# Patient Record
Sex: FEMALE | Race: WHITE | Employment: FULL TIME | URBAN - NONMETROPOLITAN AREA
[De-identification: names, ages, dates, MRNs, and addresses within clinical notes are randomized per-mention and may not be internally consistent; named-entity substitution may affect disease eponyms.]

---

## 2022-05-13 LAB — HCV AB SER-ACNC: NEGATIVE

## 2022-12-04 LAB — EXTERNAL HIV SCREEN: NORMAL

## 2024-03-05 ENCOUNTER — OFFICE VISIT (OUTPATIENT)
Dept: FAMILY MEDICINE CLINIC | Facility: CLINIC | Age: 24
End: 2024-03-05
Payer: COMMERCIAL

## 2024-03-05 VITALS
RESPIRATION RATE: 18 BRPM | BODY MASS INDEX: 18.37 KG/M2 | DIASTOLIC BLOOD PRESSURE: 68 MMHG | HEIGHT: 64 IN | WEIGHT: 107.6 LBS | SYSTOLIC BLOOD PRESSURE: 112 MMHG | TEMPERATURE: 98.9 F | OXYGEN SATURATION: 98 % | HEART RATE: 93 BPM

## 2024-03-05 DIAGNOSIS — Z76.89 ENCOUNTER TO ESTABLISH CARE: Primary | ICD-10-CM

## 2024-03-05 DIAGNOSIS — M53.3 COCCYGEAL PAIN: ICD-10-CM

## 2024-03-05 DIAGNOSIS — Z59.89 DOES NOT HAVE HEALTH INSURANCE: ICD-10-CM

## 2024-03-05 DIAGNOSIS — T14.8XXA MUSCULOSKELETAL STRAIN: ICD-10-CM

## 2024-03-05 PROCEDURE — T1015 CLINIC SERVICE: HCPCS | Performed by: FAMILY MEDICINE

## 2024-03-05 RX ORDER — FOLIC ACID, .BETA.-CAROTENE, ASCORBIC ACID, CHOLECALCIFEROL, .ALPHA.-TOCOPHEROL ACETATE, DL-, THIAMINE MONONITRATE, RIBOFLAVIN, NIACINAMIDE, PYRIDOXINE HYDROCHLORIDE, CYANOCOBALAMIN, CALCIUM PANTOTHENATE, CALCIUM CARBONATE, FERROUS FUMARATE, AND ZINC OXIDE 1; 1000; 100; 400; 30; 3; 3; 15; 20; 12; 7; 200; 29; 20 MG/1; [IU]/1; MG/1; [IU]/1; [IU]/1; MG/1; MG/1; MG/1; MG/1; UG/1; MG/1; MG/1; MG/1; MG/1
TABLET, CHEWABLE ORAL
COMMUNITY

## 2024-03-05 SDOH — ECONOMIC STABILITY - INCOME SECURITY: OTHER PROBLEMS RELATED TO HOUSING AND ECONOMIC CIRCUMSTANCES: Z59.89

## 2024-03-05 NOTE — PROGRESS NOTES
Assessment/Plan:  Patient's symptoms are most likely 2/2 musculoskeletal pain and less likely evidence of an occult fracture without a traumatic mechanism and without reason to suspect osteoporosis at her age. Too far removed from pregnancy which was in 2022 to be related to that logically. Does not appear to be sciatica. Recommended OTC acetaminophen and ibuprofen prn for symptoms. Will re-evaluate for annual physical.  No depression or anxiety currently.     Diagnoses and all orders for this visit:    Encounter to establish care    Musculoskeletal strain    Coccygeal pain    Does not have health insurance  -     Ambulatory Referral to Social Work Care Management Program; Future    Other orders  -     Prenatal Vit-Fe Fumarate-FA (Prenatal 19) 29-1 MG CHEW; Chew  -     Ferrous Sulfate (IRON PO); Take 1 tablet by mouth daily (Patient not taking: Reported on 3/5/2024)          Subjective:      Patient ID: Kodi Braga is a 23 y.o. female.    HPI  CC: establish, tailbone/sciatic pain  X1 month tailbone & left LE pain posterior radiating to the knee but not the foot with walking, sitting, standing, constant. Does not change with bending/positional changes. Has been improving the past week with more efforts to walk and stand. Sits down as an  at work but it is a small office and does not cover insurance. States when she was pregnant had sciatica which improved after delivery. Possibly strained while lifting her daughter or her daughter's diaper bags over the past month.  Hx: Scioliosis, postpartum depression resolved, anxiety resolved postpartum, diastasis recti  Meds: prenatal vitamin  Surgical: Joffre teeth removal  Family: mother fibromyalgia & scoliosis, father healthy, grandmother DM and grandparents HTN  Immunizations: up to date childhood, covid x2 shots, flu shot declined  Social: No financial or transportation difficulties. Lives with  and child, no pets. Sexually active 1 male partner,  menarche 10yo, regular menses last 7d no heavy bleeding. Condom for contraception, discussed contraceptive options for efficacy, discussed OTC contraceptive method overlapping for increased efficacy as she does not want hormonal contraception at this time.    Other: Hopes to become pregnant in maybe 1-2 years. In November intends to look into getting insurance through 's job.    PHQ-2/9 Depression Screening    Little interest or pleasure in doing things: 0 - not at all  Feeling down, depressed, or hopeless: 0 - not at all  PHQ-2 Score: 0  PHQ-2 Interpretation: Negative depression screen       LORENA-7 Flowsheet Screening      Flowsheet Row Most Recent Value   Over the last 2 weeks, how often have you been bothered by any of the following problems?    Feeling nervous, anxious, or on edge 1   Not being able to stop or control worrying 0   Worrying too much about different things 1   Trouble relaxing 0   Being so restless that it is hard to sit still 0   Becoming easily annoyed or irritable 0   Feeling afraid as if something awful might happen 2   LORENA-7 Total Score 4              The following portions of the patient's history were reviewed and updated as appropriate: allergies, current medications, past family history, past medical history, past social history, past surgical history, and problem list.    Review of Systems   Constitutional:  Negative for chills and fever.   Respiratory:  Negative for cough and shortness of breath.    Cardiovascular:  Negative for chest pain and palpitations.   Gastrointestinal:  Negative for abdominal pain, constipation, diarrhea, nausea and vomiting.        No bowel incontinence   Genitourinary:  Negative for decreased urine volume and difficulty urinating.        No bladder incontinence   Musculoskeletal:  Positive for back pain. Negative for arthralgias and gait problem.   Skin:  Negative for color change and rash.   Neurological:  Negative for seizures and syncope.  "  Psychiatric/Behavioral:  Negative for agitation, confusion and suicidal ideas. The patient is not nervous/anxious.    All other systems reviewed and are negative.        Objective:      /68 (BP Location: Left arm, Patient Position: Sitting, Cuff Size: Standard)   Pulse 93   Temp 98.9 °F (37.2 °C) (Tympanic)   Resp 18   Ht 5' 4\" (1.626 m)   Wt 48.8 kg (107 lb 9.6 oz)   SpO2 98%   Breastfeeding Yes   BMI 18.47 kg/m²          Physical Exam  Vitals reviewed.   Constitutional:       General: She is not in acute distress.     Appearance: Normal appearance. She is not ill-appearing.   HENT:      Head: Normocephalic and atraumatic.      Nose: No rhinorrhea.   Eyes:      General: No scleral icterus.        Right eye: No discharge.         Left eye: No discharge.      Conjunctiva/sclera: Conjunctivae normal.   Cardiovascular:      Rate and Rhythm: Normal rate and regular rhythm.      Pulses: Normal pulses.      Heart sounds: Normal heart sounds. No murmur heard.     No friction rub. No gallop.   Pulmonary:      Effort: Pulmonary effort is normal. No respiratory distress.      Breath sounds: Normal breath sounds. No stridor. No wheezing, rhonchi or rales.   Abdominal:      General: Bowel sounds are normal. There is no distension.      Palpations: Abdomen is soft. There is no mass.      Tenderness: There is no abdominal tenderness. There is no guarding or rebound.   Musculoskeletal:         General: No swelling.   Skin:     General: Skin is warm and dry.      Coloration: Skin is not jaundiced or pale.   Neurological:      Mental Status: She is alert.   Psychiatric:         Mood and Affect: Mood normal.         Behavior: Behavior normal.           "

## 2024-03-07 ENCOUNTER — PATIENT OUTREACH (OUTPATIENT)
Dept: FAMILY MEDICINE CLINIC | Facility: CLINIC | Age: 24
End: 2024-03-07

## 2024-03-07 NOTE — PROGRESS NOTES
Received referral from patients PCP Luis Amaya MD requesting that [facility] outreach patient and assist with obtaining insurance.    CM spoke to patient, introduced role and reason for call. Patient reported that she did have MA in the past but is over the income limit now. Discussed [employer] insurance marketplace and patient reported that she did try that as well and the lowest cost premiums were still very high. Patient reported that she will look into possibly getting on her husbands health insurance this fall.    Patient reported no other needs. SW encouraged patient to reach out if other needs arise, patient understood and thankful for outreach.

## 2024-04-05 ENCOUNTER — TELEPHONE (OUTPATIENT)
Dept: ADMINISTRATIVE | Facility: OTHER | Age: 24
End: 2024-04-05

## 2024-04-05 LAB — EXTERNAL CHLAMYDIA RESULT: NOT DETECTED

## 2024-04-05 NOTE — TELEPHONE ENCOUNTER
----- Message from Alexander Lincoln MA sent at 4/5/2024 12:23 PM EDT -----  Regarding: HEP C /HIV, CHLAMYDIA & PAP  04/05/24 12:23 PM    Hello, our patient Kodi Braga has had Hepatitis C and HIV completed/performed. Please assist in updating the patient chart by pulling the Care Everywhere (CE) document. The date of service is 5/13/22.     Thank you,  Alexander Lincoln MA  [facility]       04/05/24 12:26 PM    Hello, our patient Kodi Braga has had Chlamydia & PAP completed/performed. Please assist in updating the patient chart by pulling the Care Everywhere (CE) document. The date of service is 11/15/22.     Thank you,  Alexander Lincoln MA  [facility]

## 2024-04-05 NOTE — TELEPHONE ENCOUNTER
Upon review of the In Basket request we were able to locate, review, and update the patient chart as requested for Chlamydia, Hepatitis C , and HIV.  Pap not found    Any additional questions or concerns should be emailed to the Practice Liaisons via the appropriate education email address, please do not reply via In Basket.    Thank you  Donita Payan

## 2024-04-10 ENCOUNTER — OFFICE VISIT (OUTPATIENT)
Dept: FAMILY MEDICINE CLINIC | Facility: CLINIC | Age: 24
End: 2024-04-10
Payer: COMMERCIAL

## 2024-04-10 VITALS
OXYGEN SATURATION: 98 % | TEMPERATURE: 99.5 F | SYSTOLIC BLOOD PRESSURE: 115 MMHG | RESPIRATION RATE: 18 BRPM | HEART RATE: 96 BPM | DIASTOLIC BLOOD PRESSURE: 75 MMHG | BODY MASS INDEX: 18.74 KG/M2 | HEIGHT: 64 IN | WEIGHT: 109.8 LBS

## 2024-04-10 DIAGNOSIS — Z13.9 SCREENING DUE: ICD-10-CM

## 2024-04-10 DIAGNOSIS — Z86.39 HISTORY OF IRON DEFICIENCY: ICD-10-CM

## 2024-04-10 DIAGNOSIS — L63.0 ALOPECIA (CAPITIS) TOTALIS: ICD-10-CM

## 2024-04-10 DIAGNOSIS — B35.1 ONYCHOMYCOSIS: ICD-10-CM

## 2024-04-10 DIAGNOSIS — Z00.00 ANNUAL PHYSICAL EXAM: Primary | ICD-10-CM

## 2024-04-10 PROCEDURE — T1015 CLINIC SERVICE: HCPCS | Performed by: FAMILY MEDICINE

## 2024-04-10 NOTE — PROGRESS NOTES
Assessment/Plan     Healthy female exam. Onychomycosis of multiple nails involves too much of the nail and too many nails to benefit from topical therapy. Not recommended to breastfeed while taking systemic antifungals. Discussed that now would not be appropriate time for treatment of nails, which she was agreeable with. Can follow up 1 year for next physical or earlier as needed and initiate antifungal treatment if stopping breastfeeding as situation develops. Discussed that, especially once breastfeeding ceases, condoms alone will be insufficient to prevent pregnancy and tus contraceptive options should be considered in the future, important to ensure no pregnancy while taking antifungals systemically in the future. Will check CBC to rule out anemia, if present can assess for iron deficiency once more.  Given Hx alopecia totalis, worth screening TSH for autoimmune comorbidities periodically    2. Patient Counseling:  --Nutrition: Stressed importance of moderation in sodium/caffeine intake, saturated fat and cholesterol, caloric balance, sufficient intake of fresh fruits, vegetables, fiber, calcium, iron, and 1 mg of folate supplement per day (for females capable of pregnancy).  --Discussed the issue of estrogen replacement, calcium supplement, and the daily use of baby aspirin.  --Exercise: Stressed the importance of regular exercise.   --Substance Abuse: Discussed cessation/primary prevention of tobacco, alcohol, or other drug use; driving or other dangerous activities under the influence; availability of treatment for abuse.    --Sexuality: Discussed sexually transmitted diseases, partner selection, use of condoms, avoidance of unintended pregnancy  and contraceptive alternatives.   --Injury prevention: Discussed safety belts, safety helmets, smoke detector, smoking near bedding or upholstery.   --Dental health: Discussed importance of regular tooth brushing, flossing, and dental visits.  --Immunizations  reviewed.  --Discussed benefits of screening colonoscopy.  --After hours service discussed with patient    3. Discussed the patient's BMI with her.  The BMI is in the acceptable range  4. Follow up in one year.    1. Annual physical exam  TSH, 3rd generation with Free T4 reflex    CBC and differential    Comprehensive metabolic panel      2. Screening due  TSH, 3rd generation with Free T4 reflex    CBC and differential    Comprehensive metabolic panel      3. Alopecia (capitis) totalis  TSH, 3rd generation with Free T4 reflex      4. History of iron deficiency  CBC and differential      5. Onychomycosis              Subjective     Kodi Braga is a 23 y.o. female and is here for a comprehensive physical exam. The patient reports as below.  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  Sees a dermatologist for toenail fungus. No appointments scheduled with derm, is concerned about her toenails given she is breastfeeding and we discussed systemic vs topical antifungals.  Has not been taking iron for a year. Will check CBC to evaluate.  Social work inquired about insurance as requested, no significant changes made as pt already attempted to obtain insurance through [place] marketplace.    Do you take any herbs or supplements that were not prescribed by a doctor? no    Gets periods every ~33days, lasting 7 days, mild-moderate, no cramps.    The following portions of the patient's history were reviewed and updated as appropriate: allergies, current medications, past family history, past medical history, past social history, past surgical history, and problem list.    Review of Systems  Review of Systems   Constitutional:  Negative for chills and fever.   HENT:  Negative for ear pain and sore throat.    Eyes:  Negative for pain and visual disturbance.   Respiratory:  Negative for cough and shortness of  "breath.    Cardiovascular:  Negative for chest pain and palpitations.   Gastrointestinal:  Negative for abdominal pain, constipation, diarrhea, nausea and vomiting.   Genitourinary:  Negative for dysuria and hematuria.   Musculoskeletal:  Negative for arthralgias and back pain.   Skin:  Negative for color change and rash.        Toenail changes   Neurological:  Negative for dizziness and light-headedness.   Psychiatric/Behavioral:  Negative for agitation and confusion.    All other systems reviewed and are negative.        Objective   /75 (BP Location: Left arm, Patient Position: Sitting, Cuff Size: Adult)   Pulse 96   Temp 99.5 °F (37.5 °C)   Resp 18   Ht 5' 4\" (1.626 m)   Wt 49.8 kg (109 lb 12.8 oz)   LMP 03/30/2024   SpO2 98%   BMI 18.85 kg/m²   Physical Exam  Vitals and nursing note reviewed.   Constitutional:       General: She is not in acute distress.     Appearance: Normal appearance. She is well-developed. She is not ill-appearing.   HENT:      Head: Normocephalic and atraumatic.      Nose: No rhinorrhea.   Eyes:      General: No scleral icterus.        Right eye: No discharge.         Left eye: No discharge.      Conjunctiva/sclera: Conjunctivae normal.   Cardiovascular:      Rate and Rhythm: Normal rate and regular rhythm.      Pulses: Normal pulses.      Heart sounds: Normal heart sounds. No murmur heard.     No friction rub. No gallop.   Pulmonary:      Effort: Pulmonary effort is normal. No respiratory distress.      Breath sounds: Normal breath sounds. No stridor. No wheezing, rhonchi or rales.   Abdominal:      General: Abdomen is flat. Bowel sounds are normal. There is no distension.      Palpations: Abdomen is soft. There is no mass.      Tenderness: There is no abdominal tenderness. There is no guarding or rebound.   Musculoskeletal:         General: No swelling.      Cervical back: Neck supple.      Comments: Toes with abnormal sizes and contours, nails are smaller than would be " expected. L great toenail & R 2nd & 3rd toenails hyperkeratotic dark yellow with raised nail plate, no spooning onycholysis or salmon pitting   Skin:     General: Skin is warm and dry.      Capillary Refill: Capillary refill takes less than 2 seconds.      Coloration: Skin is not jaundiced or pale.   Neurological:      Mental Status: She is alert.   Psychiatric:         Mood and Affect: Mood normal.         Behavior: Behavior normal.

## 2024-05-20 ENCOUNTER — OFFICE VISIT (OUTPATIENT)
Dept: URGENT CARE | Facility: CLINIC | Age: 24
End: 2024-05-20
Payer: COMMERCIAL

## 2024-05-20 ENCOUNTER — APPOINTMENT (OUTPATIENT)
Dept: RADIOLOGY | Facility: CLINIC | Age: 24
End: 2024-05-20
Payer: COMMERCIAL

## 2024-05-20 VITALS
RESPIRATION RATE: 18 BRPM | TEMPERATURE: 98.6 F | HEART RATE: 105 BPM | DIASTOLIC BLOOD PRESSURE: 60 MMHG | OXYGEN SATURATION: 98 % | SYSTOLIC BLOOD PRESSURE: 102 MMHG

## 2024-05-20 DIAGNOSIS — S93.401A SPRAIN OF RIGHT ANKLE, UNSPECIFIED LIGAMENT, INITIAL ENCOUNTER: Primary | ICD-10-CM
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
## 2024-05-20 DIAGNOSIS — S93.601A SPRAIN OF RIGHT FOOT, INITIAL ENCOUNTER: ICD-10-CM

## 2024-05-20 PROCEDURE — 73630 X-RAY EXAM OF FOOT: CPT

## 2024-05-20 PROCEDURE — 73610 X-RAY EXAM OF ANKLE: CPT

## 2024-05-20 NOTE — PROGRESS NOTES
[facility]        NAME: Kodi Braga is a 23 y.o. female  : 2000    MRN: [id number]  DATE: May 20, 2024  TIME: 11:21 AM    Assessment and Plan   Sprain of right ankle, unspecified ligament, initial encounter [S93.401A]  1. Sprain of right ankle, unspecified ligament, initial encounter  XR ankle 3+ vw right    XR foot 3+ vw right      2. Sprain of right foot, initial encounter          XR provider read: no acute fracture or dislocation.     Patient Instructions     Tylenol/Ibuprofen for pain  Wear brace, splint or ACE wrap +/- crutches for support (Remove braces and ACE bandages every 3 hours)  Wear shoe arch support for foot injuries (ex: superfeet insoles)  Ice 20 minutes 3-4 times per day for 3 days  Insulate the skin from the ice to prevent frostbite  Rest and Elevate  Follow up with orthopedic if symptoms do not improve  Follow up with PCP in 3-5 days.  Proceed to  ER if symptoms worsen.    If tests have been performed at [facility], our office will contact you with results if changes need to be made to the care plan discussed with you at the visit.  [facility]    Remove splint/brace and go straight to ER if you experience sudden increase in pain, swelling, change in color/temperature/sensation, chest pain, shortness or breath, or if you start coughing up blood.        Chief Complaint     Chief Complaint   Patient presents with    Pain     Right foot twisted it yesterday while jogging  localizes pain over 4th and 5th metatarsals no swelling or ecchymoses          History of Present Illness       Ankle Injury   Incident onset: yesterday. The injury mechanism was an inversion injury. Pain location: R foot/ankle. The pain is moderate. Pertinent negatives include no inability to bear weight, numbness or tingling. The symptoms are aggravated by movement, weight bearing and palpation. She has tried nothing for the symptoms.       Review of Systems    Review of Systems   Musculoskeletal:  Positive for gait problem. Negative for joint swelling.   Neurological:  Negative for tingling, weakness and numbness.         Current Medications       Current Outpatient Medications:     Prenatal Vit-Fe Fumarate-FA (Prenatal 19) 29-1 MG CHEW, Chew, Disp: , Rfl:     Ferrous Sulfate (IRON PO), Take 1 tablet by mouth daily (Patient not taking: Reported on 3/5/2024), Disp: , Rfl:     Current Allergies     Allergies as of 05/20/2024 - Reviewed 05/20/2024   Allergen Reaction Noted    Avocado - food allergy GI Intolerance 09/27/2021            The following portions of the patient's history were reviewed and updated as appropriate: allergies, current medications, past family history, past medical history, past social history, past surgical history and problem list.     Past Medical History:   Diagnosis Date    Anxiety     Depression        Past Surgical History:   Procedure Laterality Date    WISDOM TOOTH EXTRACTION  07/2021       Family History   Problem Relation Age of Onset    Fibromyalgia Mother     Diabetes Maternal Grandmother     Heart disease Maternal Grandfather     Hypertension Paternal Grandmother     Stroke Paternal Grandfather          Medications have been verified.        Objective   /60   Pulse 105   Temp 98.6 °F (37 °C)   Resp 18   SpO2 98%   No LMP recorded.       Physical Exam     Physical Exam  Vitals reviewed.   Constitutional:       General: She is not in acute distress.     Appearance: She is well-developed.   Cardiovascular:      Rate and Rhythm: Normal rate and regular rhythm.      Pulses: Normal pulses.      Heart sounds: Normal heart sounds. No murmur heard.     No friction rub. No gallop.   Pulmonary:      Effort: Pulmonary effort is normal. No respiratory distress.      Breath sounds: Normal breath sounds. No wheezing, rhonchi or rales.   Musculoskeletal:         General: Tenderness present. No swelling, deformity or edema. Normal range of  motion.        Feet:    Skin:     General: Skin is warm.      Capillary Refill: Capillary refill takes less than 2 seconds.      Findings: No bruising or erythema.   Neurological:      Mental Status: She is alert and oriented to person, place, and time.      Sensory: No sensory deficit.      Deep Tendon Reflexes: Reflexes are normal and symmetric.   Psychiatric:         Mood and Affect: Mood and affect normal.         Behavior: Behavior normal.         Thought Content: Thought content normal.         Judgment: Judgment normal.

## 2024-05-20 NOTE — PATIENT INSTRUCTIONS
Tylenol/Ibuprofen for pain  Wear brace, splint or ACE wrap +/- crutches for support (Remove braces and ACE bandages every 3 hours)  Wear shoe arch support for foot injuries (ex: superfeet insoles)  Ice 20 minutes 3-4 times per day for 3 days  Insulate the skin from the ice to prevent frostbite  Rest and Elevate  Follow up with orthopedic if symptoms do not improve  Follow up with PCP in 3-5 days.  Proceed to  ER if symptoms worsen.    If tests have been performed at [facility], our office will contact you with results if changes need to be made to the care plan discussed with you at the visit.  You can review your full results on [facility].    Remove splint/brace and go straight to ER if you experience sudden increase in pain, swelling, change in color/temperature/sensation, chest pain, shortness or breath, or if you start coughing up blood.